# Patient Record
Sex: FEMALE | Race: WHITE | HISPANIC OR LATINO
[De-identification: names, ages, dates, MRNs, and addresses within clinical notes are randomized per-mention and may not be internally consistent; named-entity substitution may affect disease eponyms.]

---

## 2023-02-02 ENCOUNTER — FORM ENCOUNTER (OUTPATIENT)
Age: 43
End: 2023-02-02

## 2023-03-27 PROBLEM — Z00.00 ENCOUNTER FOR PREVENTIVE HEALTH EXAMINATION: Status: ACTIVE | Noted: 2023-03-27

## 2023-05-04 ENCOUNTER — APPOINTMENT (OUTPATIENT)
Dept: OBGYN | Facility: CLINIC | Age: 43
End: 2023-05-04
Payer: COMMERCIAL

## 2023-05-04 VITALS
DIASTOLIC BLOOD PRESSURE: 86 MMHG | BODY MASS INDEX: 34.66 KG/M2 | HEIGHT: 64 IN | SYSTOLIC BLOOD PRESSURE: 130 MMHG | WEIGHT: 203 LBS

## 2023-05-04 DIAGNOSIS — I10 ESSENTIAL (PRIMARY) HYPERTENSION: ICD-10-CM

## 2023-05-04 DIAGNOSIS — Z83.3 FAMILY HISTORY OF DIABETES MELLITUS: ICD-10-CM

## 2023-05-04 DIAGNOSIS — Z78.9 OTHER SPECIFIED HEALTH STATUS: ICD-10-CM

## 2023-05-04 DIAGNOSIS — Z80.3 FAMILY HISTORY OF MALIGNANT NEOPLASM OF BREAST: ICD-10-CM

## 2023-05-04 DIAGNOSIS — T83.32XA DISPLACEMENT OF INTRAUTERINE CONTRACEPTIVE DEVICE, INITIAL ENCOUNTER: ICD-10-CM

## 2023-05-04 DIAGNOSIS — Z87.42 PERSONAL HISTORY OF OTHER DISEASES OF THE FEMALE GENITAL TRACT: ICD-10-CM

## 2023-05-04 DIAGNOSIS — Z82.49 FAMILY HISTORY OF ISCHEMIC HEART DISEASE AND OTHER DISEASES OF THE CIRCULATORY SYSTEM: ICD-10-CM

## 2023-05-04 LAB
HCG UR QL: NEGATIVE
QUALITY CONTROL: YES

## 2023-05-04 PROCEDURE — 81025 URINE PREGNANCY TEST: CPT

## 2023-05-04 PROCEDURE — 99205 OFFICE O/P NEW HI 60 MIN: CPT

## 2023-05-04 RX ORDER — LOSARTAN POTASSIUM AND HYDROCHLOROTHIAZIDE 25; 100 MG/1; MG/1
100-25 TABLET ORAL
Refills: 0 | Status: ACTIVE | COMMUNITY

## 2023-05-04 RX ORDER — MULTIVITAMIN
TABLET ORAL
Refills: 0 | Status: ACTIVE | COMMUNITY

## 2023-05-15 ENCOUNTER — NON-APPOINTMENT (OUTPATIENT)
Age: 43
End: 2023-05-15

## 2023-05-15 NOTE — REASON FOR VISIT
[Initial] : an initial consultation for [FreeTextEntry2] : IUD complaint [FreeTextEntry1] : julio cesar RO

## 2023-05-15 NOTE — CONSULT LETTER
[Dear  ___] : Dear  [unfilled], [FreeTextEntry1] : I had the pleasure of evaluating your patient, SHEA SALINAS. Please see my note enclosed for full details.\par \par Thank you for allowing me to participate in the care of this patient. If you have any questions, please do not hesitate to contact me.\par \par Sincerely,\par \par Criss Gore MD, FACOG \par [facility]\par Obstetrics and Gynecology in [place]\[address]\[place] [address]\[facility] Phone: [phone] Fax: [phone]

## 2023-05-15 NOTE — PHYSICAL EXAM
[Chaperone Present] : A chaperone was present in the examining room during all aspects of the physical examination [Appropriately responsive] : appropriately responsive [Alert] : alert [No Acute Distress] : no acute distress [No Lymphadenopathy] : no lymphadenopathy [Soft] : soft [Non-tender] : non-tender [Non-distended] : non-distended [No HSM] : No HSM [No Lesions] : no lesions [No Mass] : no mass [Oriented x3] : oriented x3 [Labia Majora] : normal [Labia Minora] : normal [Normal] : normal [Uterine Adnexae] : normal [FreeTextEntry1] : JESSICA Tinoco  [FreeTextEntry7] : pfannensteil incision well-healed [Tenderness] : nontender [Enlarged ___ wks] : not enlarged [FreeTextEntry8] : no uterosacral nodularity palpated

## 2023-05-15 NOTE — HISTORY OF PRESENT ILLNESS
[Patient reported mammogram was normal] : Patient reported mammogram was normal [Patient reported PAP Smear was normal] : Patient reported PAP Smear was normal [LMP unknown] : LMP unknown [Menarche Age ____] : age at menarche was [unfilled] [Irregular Cycle Intervals] : are  irregular [Y] : Positive pregnancy history [___] : #3 ([unfilled]): [Pregnancy History] : girl [unknown] : Patient is unsure of the date of her LMP [Menarche Age: ____] : age at menarche was [unfilled] [Currently Active] : currently active [Men] : men [Condoms] : uses condoms [Monogamous (Male Partner)] : is monogamous with a male partner [Mammogramdate] : 11/21 [PapSmeardate] : 02/23 [LMPDate] : 2/2023 [MensesFreq] : irregular [MensesLength] : 6-8 [PGHxTotal] : 4 [[facility] Center[facility]] : 3 [PGHxABSpont] : 1 [FreeTextEntry1] : C/S x3. +h/o cyst in past, denies fibroids, hx abnormal pap in past resolved, denies STI.

## 2023-05-15 NOTE — DISCUSSION/SUMMARY
[FreeTextEntry1] : 41 yo with IUD strings unable to be visualized, irregular bleeding, MRI findings of possible IUD arm perforation through  section scar. Possible hx of endometriosis. \par -Prior operative report from last C/S requested to review\par -Options reviewed for contraception including reversible and irreversible options. Pt desires permanent sterilization. Discussed tubal fulguration vs bilateral salpingectomy, pt desires bilateral salpingectomy. \par -Tentative surgical plan for pelvic EUA, hysteroscopic IUD removal, diagnostic laparoscopy, bilateral salpingectomy, possible repair of uterine perforation, possible excision of endometriosis, possible robotic assistance, all indicated procedures. Discussed surgical plan intraoperatively will depend on findings of IUD location. \par -She will be contacted with a surgical date. \par -All questions answered to her satisfaction.

## 2023-06-30 ENCOUNTER — FORM ENCOUNTER (OUTPATIENT)
Age: 43
End: 2023-06-30

## 2023-07-01 ENCOUNTER — FORM ENCOUNTER (OUTPATIENT)
Age: 43
End: 2023-07-01

## 2023-07-02 ENCOUNTER — FORM ENCOUNTER (OUTPATIENT)
Age: 43
End: 2023-07-02

## 2023-07-04 ENCOUNTER — FORM ENCOUNTER (OUTPATIENT)
Age: 43
End: 2023-07-04

## 2023-07-05 ENCOUNTER — APPOINTMENT (OUTPATIENT)
Dept: OBGYN | Facility: CLINIC | Age: 43
End: 2023-07-05

## 2023-07-06 ENCOUNTER — FORM ENCOUNTER (OUTPATIENT)
Age: 43
End: 2023-07-06

## 2023-07-06 ENCOUNTER — APPOINTMENT (OUTPATIENT)
Dept: OBGYN | Facility: CLINIC | Age: 43
End: 2023-07-06
Payer: COMMERCIAL

## 2023-07-06 VITALS
DIASTOLIC BLOOD PRESSURE: 90 MMHG | SYSTOLIC BLOOD PRESSURE: 140 MMHG | BODY MASS INDEX: 34.66 KG/M2 | WEIGHT: 203 LBS | HEIGHT: 64 IN

## 2023-07-06 DIAGNOSIS — Z30.09 ENCOUNTER FOR OTHER GENERAL COUNSELING AND ADVICE ON CONTRACEPTION: ICD-10-CM

## 2023-07-06 DIAGNOSIS — T83.32XD DISPLACEMENT OF INTRAUTERINE CONTRACEPTIVE DEVICE, SUBSEQUENT ENCOUNTER: ICD-10-CM

## 2023-07-06 DIAGNOSIS — R10.2 PELVIC AND PERINEAL PAIN: ICD-10-CM

## 2023-07-06 PROCEDURE — 99214 OFFICE O/P EST MOD 30 MIN: CPT

## 2023-07-06 RX ORDER — TRAMADOL HYDROCHLORIDE 50 MG/1
50 TABLET, COATED ORAL
Qty: 10 | Refills: 0 | Status: ACTIVE | COMMUNITY
Start: 2023-07-06 | End: 1900-01-01

## 2023-07-06 RX ORDER — DOCUSATE SODIUM 100 MG/1
100 CAPSULE ORAL
Qty: 60 | Refills: 0 | Status: ACTIVE | COMMUNITY
Start: 2023-07-06 | End: 1900-01-01

## 2023-07-06 NOTE — DISCUSSION/SUMMARY
[FreeTextEntry1] : 43 yo with IUD strings unable to be visualized, irregular bleeding, MRI findings of possible IUD arm perforation through  section scar. Possible hx of endometriosis. \par -Plan for pelvic EUA, hysteroscopic IUD removal, diagnostic laparoscopy, bilateral salpingectomy, possible repair of uterine perforation, possible excision of endometriosis, possible robotic assistance, all indicated procedures .\par -Postoperative expectations and restrictions reviewed\par -Rx Tramadol/colace sent, pt to get OTC Motrin/Tylenol\par -s/p PST\par -All questions answered to her satisfaction \par

## 2023-07-06 NOTE — HISTORY OF PRESENT ILLNESS
[FreeTextEntry1] : Pt here for follow-up. Plan for surgery next week. Continues to have intermittent spotting with some cramping similar to menses. \par \par PSH C/S x 3. \par \par s/p PST, labs wnl.\par \par Tentative surgical plan for pelvic EUA, hysteroscopic IUD removal, diagnostic laparoscopy, bilateral salpingectomy, possible repair of uterine perforation, possible excision of endometriosis, possible robotic assistance, all indicated procedures. Discussed surgical plan intraoperatively will depend on findings of IUD location. \par \par Discussed risks of bleeding, possible transfusion, infection, fistula, damage to nearby organs, vessels, or nerves requiring temporary or permanent injury. Discussed risk of laparotomy if unable to have adequate visualization to complete using minimally invasive approach.We reviewed the planned location of the incisions and I showed them to her on her abdomen.\par \par Postoperative pain management was reviewed with plan for alternating Tylenol and Motrin with Tramadol for breakthrough pain. Discussed the benefit of ice packs x first 24 hours.  Discussed postoperative expectations and restrictions. \par \par We reviewed that there will be physician assistants and nurse practitioners in the operating room who may assist in the pelvic exam for learning purposes and who will be assisting in the surgery. \par \par Discussed postoperatively she may need medical management of endometriosis, if present, for suppression. Discussed the chronic nature of endometriosis. \par \par She agrees to proceed. All of her questions were answered to her satisfaction.

## 2023-07-11 ENCOUNTER — RESULT REVIEW (OUTPATIENT)
Age: 43
End: 2023-07-11

## 2023-07-11 ENCOUNTER — APPOINTMENT (OUTPATIENT)
Dept: OBGYN | Facility: HOSPITAL | Age: 43
End: 2023-07-11

## 2023-08-10 ENCOUNTER — APPOINTMENT (OUTPATIENT)
Dept: OBGYN | Facility: CLINIC | Age: 43
End: 2023-08-10
Payer: COMMERCIAL

## 2023-08-10 ENCOUNTER — TRANSCRIPTION ENCOUNTER (OUTPATIENT)
Age: 43
End: 2023-08-10

## 2023-08-10 VITALS
HEIGHT: 64 IN | DIASTOLIC BLOOD PRESSURE: 84 MMHG | WEIGHT: 200 LBS | BODY MASS INDEX: 34.15 KG/M2 | SYSTOLIC BLOOD PRESSURE: 130 MMHG

## 2023-08-10 DIAGNOSIS — Z48.89 ENCOUNTER FOR OTHER SPECIFIED SURGICAL AFTERCARE: ICD-10-CM

## 2023-08-10 PROCEDURE — 99212 OFFICE O/P EST SF 10 MIN: CPT

## 2023-08-10 NOTE — CONSULT LETTER
[Dear  ___] : Dear  [unfilled], [FreeTextEntry1] : I had the pleasure of evaluating your patient, SHEA HURTADO, for her postoperative visit. She underwent hysteroscopy IUD removal, laparoscopic bilateral salpingectomy, lysis of adhesions 7/11/23. She is doing well postoperatively and I have instructed her to follow up with you for her usual gynecologic care. We will enclose her visit notes, operative report, and pathology report for your records.   Thank you for allowing me to participate in the care of this patient. If you have any questions, please do not hesitate to contact me.  Sincerely,  Criss Gore MD, [facility]  [facility]etric[facility]necology in [address], [address] [place] [address] Phone: [phone] Fax: [phone]

## 2023-08-10 NOTE — HISTORY OF PRESENT ILLNESS
[FreeTextEntry1] : 41 yo s/p hysteroscopy IUD removal, laparoscopic bilateral salpingectomy, lysis of adhesions 7/11/23 here for postop visit. Denies pain, tolerating regular diet, ambulating, voiding, normal BM.   Pathology: bilateral fallopian tubes, benign.  Intraoperative and pathology findings reviewed with pt, all questions answered.

## 2023-08-10 NOTE — PHYSICAL EXAM
[FreeTextEntry1] : JESSICA Tinoco  [Chaperone Present] : A chaperone was present in the examining room during all aspects of the physical examination [Appropriately responsive] : appropriately responsive [Alert] : alert [No Acute Distress] : no acute distress [No Lymphadenopathy] : no lymphadenopathy [Soft] : soft [Non-tender] : non-tender [Non-distended] : non-distended [No HSM] : No HSM [No Lesions] : no lesions [No Mass] : no mass [Oriented x3] : oriented x3 [FreeTextEntry7] : incisions c/d/i, no erythema/drainage

## 2023-08-10 NOTE — DISCUSSION/SUMMARY
[FreeTextEntry1] : 43 yo s/p hysteroscopy IUD removal, laparoscopic bilateral salpingectomy, lysis of adhesions 7/11/23 doing well postoperatively.  -Can resume all activities -Resume usual GYN care